# Patient Record
Sex: FEMALE | Race: WHITE | NOT HISPANIC OR LATINO | URBAN - METROPOLITAN AREA
[De-identification: names, ages, dates, MRNs, and addresses within clinical notes are randomized per-mention and may not be internally consistent; named-entity substitution may affect disease eponyms.]

---

## 2017-10-12 ENCOUNTER — EMERGENCY (EMERGENCY)
Facility: HOSPITAL | Age: 32
LOS: 1 days | Discharge: HOME | End: 2017-10-12
Admitting: EMERGENCY MEDICINE
Payer: COMMERCIAL

## 2017-10-12 VITALS
SYSTOLIC BLOOD PRESSURE: 134 MMHG | OXYGEN SATURATION: 96 % | DIASTOLIC BLOOD PRESSURE: 81 MMHG | RESPIRATION RATE: 16 BRPM | HEART RATE: 61 BPM | TEMPERATURE: 98 F

## 2017-10-12 DIAGNOSIS — S91.312D LACERATION WITHOUT FOREIGN BODY, LEFT FOOT, SUBSEQUENT ENCOUNTER: ICD-10-CM
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
## 2017-10-12 PROCEDURE — 99282 EMERGENCY DEPT VISIT SF MDM: CPT

## 2017-10-12 NOTE — ED PROVIDER NOTE - MEDICAL DECISION MAKING DETAILS
31 y/o F presents to ED from urgent care for evaluation of persistent bleeding from L foot laceration s/p laceration repair today.  Pt reports negative x-ray for foreign body performed at urgent care and she is up to date with tetanus.  Bleeding controlled before evaluation.  Foot cleansed, bacitracin applied and sterile dressing reapplied.  Pt discharge with wound care instructions and return precautions.

## 2017-10-12 NOTE — ED PROVIDER NOTE - SKIN WOUND TYPE
Pt has some wet blood on plantar surface of foot from laceration.  3 cm laceration to dorsal L foot with sutures in place, no active bleeding, swelling or ecchymosis.  borders of laceration are well approximated.

## 2017-10-12 NOTE — ED PROVIDER NOTE - OBJECTIVE STATEMENT
31 y/o F presents to ED s/p laceration repair at urgent care.  She states she dropped a glass bottle on her foot causing the laceration.  She reports a significant amount of bleeding.  An x-ray was performed at urgent care with no foreign body.  The laceration was repaired with sutures but continued to bleed.  She was sent to ED for further evaluation and told she may need internal sutures.  Pt arrives with foot bandage and in post op shoe.  She reports her tetanus is up to date.

## 2017-10-12 NOTE — ED ADULT NURSE NOTE - OBJECTIVE STATEMENT
Pt had lac repaired with sutures. Was sent from urgent care for eval of small amount of bleeding at suture site

## 2017-10-12 NOTE — ED ADULT NURSE NOTE - CHPI ED SYMPTOMS NEG
no fever/no tingling/no nausea/no decreased eating/drinking/no vomiting/no chills/no dizziness/no weakness/no numbness/no pain